# Patient Record
Sex: FEMALE | Race: WHITE | NOT HISPANIC OR LATINO | Employment: OTHER | URBAN - METROPOLITAN AREA
[De-identification: names, ages, dates, MRNs, and addresses within clinical notes are randomized per-mention and may not be internally consistent; named-entity substitution may affect disease eponyms.]

---

## 2024-02-08 ENCOUNTER — HOSPITAL ENCOUNTER (EMERGENCY)
Facility: HOSPITAL | Age: 24
Discharge: HOME | End: 2024-02-09
Attending: EMERGENCY MEDICINE
Payer: COMMERCIAL

## 2024-02-08 VITALS
HEART RATE: 89 BPM | BODY MASS INDEX: 30.36 KG/M2 | WEIGHT: 165 LBS | DIASTOLIC BLOOD PRESSURE: 74 MMHG | OXYGEN SATURATION: 97 % | HEIGHT: 62 IN | RESPIRATION RATE: 18 BRPM | SYSTOLIC BLOOD PRESSURE: 111 MMHG | TEMPERATURE: 98.3 F

## 2024-02-08 DIAGNOSIS — J11.1 FLU: Primary | ICD-10-CM

## 2024-02-08 PROCEDURE — 99283 EMERGENCY DEPT VISIT LOW MDM: CPT | Performed by: EMERGENCY MEDICINE

## 2024-02-08 ASSESSMENT — PAIN SCALES - GENERAL: PAINLEVEL_OUTOF10: 7

## 2024-02-08 ASSESSMENT — COLUMBIA-SUICIDE SEVERITY RATING SCALE - C-SSRS
1. IN THE PAST MONTH, HAVE YOU WISHED YOU WERE DEAD OR WISHED YOU COULD GO TO SLEEP AND NOT WAKE UP?: NO
6. HAVE YOU EVER DONE ANYTHING, STARTED TO DO ANYTHING, OR PREPARED TO DO ANYTHING TO END YOUR LIFE?: NO
2. HAVE YOU ACTUALLY HAD ANY THOUGHTS OF KILLING YOURSELF?: NO

## 2024-02-08 ASSESSMENT — PAIN - FUNCTIONAL ASSESSMENT: PAIN_FUNCTIONAL_ASSESSMENT: 0-10

## 2024-02-09 ENCOUNTER — APPOINTMENT (OUTPATIENT)
Dept: RADIOLOGY | Facility: HOSPITAL | Age: 24
End: 2024-02-09
Payer: COMMERCIAL

## 2024-02-09 LAB
FLUAV RNA RESP QL NAA+PROBE: NOT DETECTED
FLUBV RNA RESP QL NAA+PROBE: DETECTED
HCG UR QL IA.RAPID: NEGATIVE
RSV RNA RESP QL NAA+PROBE: NOT DETECTED
SARS-COV-2 RNA RESP QL NAA+PROBE: NOT DETECTED

## 2024-02-09 PROCEDURE — 87637 SARSCOV2&INF A&B&RSV AMP PRB: CPT | Performed by: NURSE PRACTITIONER

## 2024-02-09 PROCEDURE — 71046 X-RAY EXAM CHEST 2 VIEWS: CPT

## 2024-02-09 PROCEDURE — 71046 X-RAY EXAM CHEST 2 VIEWS: CPT | Performed by: STUDENT IN AN ORGANIZED HEALTH CARE EDUCATION/TRAINING PROGRAM

## 2024-02-09 PROCEDURE — 81025 URINE PREGNANCY TEST: CPT | Performed by: NURSE PRACTITIONER

## 2024-02-09 RX ORDER — ONDANSETRON 4 MG/1
4 TABLET, ORALLY DISINTEGRATING ORAL EVERY 8 HOURS PRN
Qty: 20 TABLET | Refills: 0 | Status: SHIPPED | OUTPATIENT
Start: 2024-02-09

## 2024-02-09 NOTE — ED PROVIDER NOTES
Chief Complaint   Patient presents with    Cough    Chest Pain       HPI       23 year old female presents to the Emergency Department today complaining of a 2-3 day history of body aches, nasal congestion, postnasal drip, nonproductive cough, and diarrhea. Denies any associated fever, chills, headache, neck pain, chest pain, shortness of breath, abdominal pain, nausea, vomiting, constipation, or urinary symptoms. Reports that her children have been ill with similar symptoms.       History provided by:  Patient             Patient History   Past Medical History:   Diagnosis Date    12 weeks gestation of pregnancy 02/26/2020    12 weeks gestation of pregnancy    Encounter for supervision of other normal pregnancy, first trimester 12/09/2021    Multigravida in first trimester    Encounter for supervision of other normal pregnancy, second trimester 05/20/2020    Multigravida in second trimester     Past Surgical History:   Procedure Laterality Date    OTHER SURGICAL HISTORY  07/27/2021    No history of surgery     No family history on file.  Social History     Tobacco Use    Smoking status: Never    Smokeless tobacco: Never   Vaping Use    Vaping Use: Every day    Substances: Nicotine   Substance Use Topics    Alcohol use: Yes     Comment: social    Drug use: Yes     Types: Marijuana           Physical Exam  Constitutional:       Appearance: Normal appearance.   HENT:      Head: Normocephalic.      Right Ear: Tympanic membrane, ear canal and external ear normal.      Left Ear: Tympanic membrane, ear canal and external ear normal.      Nose: Nose normal.      Mouth/Throat:      Lips: Pink.      Mouth: Mucous membranes are moist.      Dentition: No dental caries.      Pharynx: Oropharynx is clear. Uvula midline. No oropharyngeal exudate or posterior oropharyngeal erythema.      Tonsils: No tonsillar exudate. 1+ on the right. 1+ on the left.   Eyes:      Conjunctiva/sclera: Conjunctivae normal.      Pupils: Pupils are  equal, round, and reactive to light.   Cardiovascular:      Rate and Rhythm: Normal rate and regular rhythm.      Heart sounds: No murmur heard.     No friction rub. No gallop.   Pulmonary:      Effort: Pulmonary effort is normal. No respiratory distress.      Breath sounds: Normal breath sounds. No stridor. No wheezing, rhonchi or rales.   Neurological:      Mental Status: She is alert.         Labs Reviewed - No data to display    No orders to display            ED Course & MDM   Diagnoses as of 02/11/24 1000   Flu           Medical Decision Making  Patient was seen and evaluated by Dr. Brown.              Your medication list      You have not been prescribed any medications.           Procedure  Procedures     AMINATA Rooney-OLGA  02/09/24 0029       AMINATA Rooney-OLGA  02/09/24 0030      This patient was seen by the ADAM.  I have personally seen and examined the patient. I made / approved the management plan and take responsibility for patient management. I reviewed and edited the above documentation where necessary.     Patient with cough, congestion, fever, malaise in setting of exposure to family members with similar symptoms.  hCG, COVID, RSV are negative.  Patient did test positive for flu.  Chest x-ray was performed and does not show any evidence of consolidation by my read.  Confirmed to be without acute findings by radiologist.    Patient is outside the window for any benefit from Tamiflu.  Results were relayed to the patient by nursing staff.  The patient is requesting discharge however was given the opportunity to stay to be further evaluated by me.    Yovana Brown DO  Emergency Medicine       Yovana Brown DO  02/11/24 1006

## 2024-02-09 NOTE — ED TRIAGE NOTES
Pt here after being diagnosed with tonsillitis at urgent care. She is now having right sided rib pain and SOB. No distress noted in triage.

## 2024-05-13 ENCOUNTER — HOSPITAL ENCOUNTER (EMERGENCY)
Facility: HOSPITAL | Age: 24
Discharge: HOME | End: 2024-05-13
Attending: STUDENT IN AN ORGANIZED HEALTH CARE EDUCATION/TRAINING PROGRAM
Payer: COMMERCIAL

## 2024-05-13 ENCOUNTER — APPOINTMENT (OUTPATIENT)
Dept: RADIOLOGY | Facility: HOSPITAL | Age: 24
End: 2024-05-13
Payer: COMMERCIAL

## 2024-05-13 VITALS
HEIGHT: 62 IN | BODY MASS INDEX: 29.44 KG/M2 | TEMPERATURE: 98.6 F | OXYGEN SATURATION: 99 % | RESPIRATION RATE: 16 BRPM | SYSTOLIC BLOOD PRESSURE: 114 MMHG | WEIGHT: 160 LBS | DIASTOLIC BLOOD PRESSURE: 74 MMHG | HEART RATE: 62 BPM

## 2024-05-13 DIAGNOSIS — B37.9 ANTIBIOTIC-INDUCED YEAST INFECTION: ICD-10-CM

## 2024-05-13 DIAGNOSIS — K61.1 PERIRECTAL ABSCESS: Primary | ICD-10-CM

## 2024-05-13 DIAGNOSIS — T36.95XA ANTIBIOTIC-INDUCED YEAST INFECTION: ICD-10-CM

## 2024-05-13 LAB
ALBUMIN SERPL BCP-MCNC: 4.4 G/DL (ref 3.4–5)
ALP SERPL-CCNC: 66 U/L (ref 33–110)
ALT SERPL W P-5'-P-CCNC: 20 U/L (ref 7–45)
ANION GAP SERPL CALC-SCNC: 9 MMOL/L (ref 10–20)
APPEARANCE UR: ABNORMAL
AST SERPL W P-5'-P-CCNC: 22 U/L (ref 9–39)
BASOPHILS # BLD AUTO: 0.03 X10*3/UL (ref 0–0.1)
BASOPHILS NFR BLD AUTO: 0.3 %
BILIRUB SERPL-MCNC: 0.7 MG/DL (ref 0–1.2)
BILIRUB UR STRIP.AUTO-MCNC: NEGATIVE MG/DL
BUN SERPL-MCNC: 12 MG/DL (ref 6–23)
CALCIUM SERPL-MCNC: 9.2 MG/DL (ref 8.6–10.3)
CAOX CRY #/AREA UR COMP ASSIST: ABNORMAL /HPF
CHLORIDE SERPL-SCNC: 105 MMOL/L (ref 98–107)
CO2 SERPL-SCNC: 27 MMOL/L (ref 21–32)
COLOR UR: YELLOW
CREAT SERPL-MCNC: 0.8 MG/DL (ref 0.5–1.05)
EGFRCR SERPLBLD CKD-EPI 2021: >90 ML/MIN/1.73M*2
EOSINOPHIL # BLD AUTO: 0.09 X10*3/UL (ref 0–0.7)
EOSINOPHIL NFR BLD AUTO: 1 %
ERYTHROCYTE [DISTWIDTH] IN BLOOD BY AUTOMATED COUNT: 13.6 % (ref 11.5–14.5)
GLUCOSE SERPL-MCNC: 94 MG/DL (ref 74–99)
GLUCOSE UR STRIP.AUTO-MCNC: NEGATIVE MG/DL
HCG UR QL IA.RAPID: NEGATIVE
HCT VFR BLD AUTO: 40.8 % (ref 36–46)
HGB BLD-MCNC: 13.4 G/DL (ref 12–16)
IMM GRANULOCYTES # BLD AUTO: 0.02 X10*3/UL (ref 0–0.7)
IMM GRANULOCYTES NFR BLD AUTO: 0.2 % (ref 0–0.9)
KETONES UR STRIP.AUTO-MCNC: ABNORMAL MG/DL
LACTATE SERPL-SCNC: 1 MMOL/L (ref 0.4–2)
LEUKOCYTE ESTERASE UR QL STRIP.AUTO: ABNORMAL
LYMPHOCYTES # BLD AUTO: 1.22 X10*3/UL (ref 1.2–4.8)
LYMPHOCYTES NFR BLD AUTO: 13.5 %
MCH RBC QN AUTO: 29.1 PG (ref 26–34)
MCHC RBC AUTO-ENTMCNC: 32.8 G/DL (ref 32–36)
MCV RBC AUTO: 89 FL (ref 80–100)
MONOCYTES # BLD AUTO: 0.66 X10*3/UL (ref 0.1–1)
MONOCYTES NFR BLD AUTO: 7.3 %
MUCOUS THREADS #/AREA URNS AUTO: ABNORMAL /LPF
NEUTROPHILS # BLD AUTO: 7.01 X10*3/UL (ref 1.2–7.7)
NEUTROPHILS NFR BLD AUTO: 77.7 %
NITRITE UR QL STRIP.AUTO: NEGATIVE
NRBC BLD-RTO: 0 /100 WBCS (ref 0–0)
PH UR STRIP.AUTO: 5 [PH]
PLATELET # BLD AUTO: 314 X10*3/UL (ref 150–450)
POTASSIUM SERPL-SCNC: 3.2 MMOL/L (ref 3.5–5.3)
PROT SERPL-MCNC: 7.1 G/DL (ref 6.4–8.2)
PROT UR STRIP.AUTO-MCNC: ABNORMAL MG/DL
RBC # BLD AUTO: 4.6 X10*6/UL (ref 4–5.2)
RBC # UR STRIP.AUTO: NEGATIVE /UL
RBC #/AREA URNS AUTO: ABNORMAL /HPF
SODIUM SERPL-SCNC: 138 MMOL/L (ref 136–145)
SP GR UR STRIP.AUTO: 1.04
SQUAMOUS #/AREA URNS AUTO: ABNORMAL /HPF
UROBILINOGEN UR STRIP.AUTO-MCNC: <2 MG/DL
WBC # BLD AUTO: 9 X10*3/UL (ref 4.4–11.3)
WBC #/AREA URNS AUTO: ABNORMAL /HPF
WBC CLUMPS #/AREA URNS AUTO: ABNORMAL /HPF

## 2024-05-13 PROCEDURE — 85025 COMPLETE CBC W/AUTO DIFF WBC: CPT | Performed by: NURSE PRACTITIONER

## 2024-05-13 PROCEDURE — 83605 ASSAY OF LACTIC ACID: CPT | Performed by: NURSE PRACTITIONER

## 2024-05-13 PROCEDURE — 36415 COLL VENOUS BLD VENIPUNCTURE: CPT | Performed by: NURSE PRACTITIONER

## 2024-05-13 PROCEDURE — 96361 HYDRATE IV INFUSION ADD-ON: CPT | Performed by: STUDENT IN AN ORGANIZED HEALTH CARE EDUCATION/TRAINING PROGRAM

## 2024-05-13 PROCEDURE — 2500000006 HC RX 250 W HCPCS SELF ADMINISTERED DRUGS (ALT 637 FOR ALL PAYERS): Performed by: NURSE PRACTITIONER

## 2024-05-13 PROCEDURE — 81001 URINALYSIS AUTO W/SCOPE: CPT | Performed by: NURSE PRACTITIONER

## 2024-05-13 PROCEDURE — 2550000001 HC RX 255 CONTRASTS: Performed by: NURSE PRACTITIONER

## 2024-05-13 PROCEDURE — 2500000004 HC RX 250 GENERAL PHARMACY W/ HCPCS (ALT 636 FOR OP/ED): Performed by: NURSE PRACTITIONER

## 2024-05-13 PROCEDURE — 74177 CT ABD & PELVIS W/CONTRAST: CPT

## 2024-05-13 PROCEDURE — 2500000004 HC RX 250 GENERAL PHARMACY W/ HCPCS (ALT 636 FOR OP/ED): Performed by: STUDENT IN AN ORGANIZED HEALTH CARE EDUCATION/TRAINING PROGRAM

## 2024-05-13 PROCEDURE — 80053 COMPREHEN METABOLIC PANEL: CPT | Performed by: NURSE PRACTITIONER

## 2024-05-13 PROCEDURE — 96365 THER/PROPH/DIAG IV INF INIT: CPT | Performed by: STUDENT IN AN ORGANIZED HEALTH CARE EDUCATION/TRAINING PROGRAM

## 2024-05-13 PROCEDURE — 74177 CT ABD & PELVIS W/CONTRAST: CPT | Performed by: RADIOLOGY

## 2024-05-13 PROCEDURE — 99284 EMERGENCY DEPT VISIT MOD MDM: CPT | Mod: 25 | Performed by: STUDENT IN AN ORGANIZED HEALTH CARE EDUCATION/TRAINING PROGRAM

## 2024-05-13 PROCEDURE — 87086 URINE CULTURE/COLONY COUNT: CPT | Mod: PORLAB | Performed by: NURSE PRACTITIONER

## 2024-05-13 PROCEDURE — 81025 URINE PREGNANCY TEST: CPT | Performed by: NURSE PRACTITIONER

## 2024-05-13 PROCEDURE — A4217 STERILE WATER/SALINE, 500 ML: HCPCS | Performed by: NURSE PRACTITIONER

## 2024-05-13 PROCEDURE — 2500000001 HC RX 250 WO HCPCS SELF ADMINISTERED DRUGS (ALT 637 FOR MEDICARE OP)

## 2024-05-13 PROCEDURE — 96375 TX/PRO/DX INJ NEW DRUG ADDON: CPT | Performed by: STUDENT IN AN ORGANIZED HEALTH CARE EDUCATION/TRAINING PROGRAM

## 2024-05-13 PROCEDURE — 87040 BLOOD CULTURE FOR BACTERIA: CPT | Mod: PORLAB | Performed by: NURSE PRACTITIONER

## 2024-05-13 RX ORDER — AMOXICILLIN AND CLAVULANATE POTASSIUM 875; 125 MG/1; MG/1
1 TABLET, FILM COATED ORAL 2 TIMES DAILY
Qty: 14 TABLET | Refills: 0 | Status: SHIPPED | OUTPATIENT
Start: 2024-05-13 | End: 2024-05-21

## 2024-05-13 RX ORDER — KETOROLAC TROMETHAMINE 30 MG/ML
INJECTION, SOLUTION INTRAMUSCULAR; INTRAVENOUS
Status: COMPLETED
Start: 2024-05-13 | End: 2024-05-13

## 2024-05-13 RX ORDER — POTASSIUM CHLORIDE 750 MG/1
40 TABLET, FILM COATED, EXTENDED RELEASE ORAL ONCE
Status: COMPLETED | OUTPATIENT
Start: 2024-05-13 | End: 2024-05-13

## 2024-05-13 RX ORDER — FLUCONAZOLE 150 MG/1
150 TABLET ORAL AS NEEDED
Qty: 2 TABLET | Refills: 0 | Status: SHIPPED | OUTPATIENT
Start: 2024-05-13

## 2024-05-13 RX ORDER — DIPHENHYDRAMINE HCL 25 MG
50 CAPSULE ORAL ONCE
Status: COMPLETED | OUTPATIENT
Start: 2024-05-13 | End: 2024-05-13

## 2024-05-13 RX ORDER — KETOROLAC TROMETHAMINE 30 MG/ML
15 INJECTION, SOLUTION INTRAMUSCULAR; INTRAVENOUS ONCE
Status: COMPLETED | OUTPATIENT
Start: 2024-05-13 | End: 2024-05-13

## 2024-05-13 RX ORDER — ONDANSETRON HYDROCHLORIDE 2 MG/ML
4 INJECTION, SOLUTION INTRAVENOUS ONCE
Status: COMPLETED | OUTPATIENT
Start: 2024-05-13 | End: 2024-05-13

## 2024-05-13 RX ORDER — DIPHENHYDRAMINE HCL 25 MG
CAPSULE ORAL
Status: COMPLETED
Start: 2024-05-13 | End: 2024-05-13

## 2024-05-13 RX ORDER — NAPROXEN 500 MG/1
500 TABLET ORAL
Qty: 14 TABLET | Refills: 0 | Status: SHIPPED | OUTPATIENT
Start: 2024-05-13 | End: 2024-05-21

## 2024-05-13 RX ADMIN — IOHEXOL 75 ML: 350 INJECTION, SOLUTION INTRAVENOUS at 19:10

## 2024-05-13 RX ADMIN — KETOROLAC TROMETHAMINE 15 MG: 30 INJECTION, SOLUTION INTRAMUSCULAR at 15:54

## 2024-05-13 RX ADMIN — PIPERACILLIN SODIUM AND TAZOBACTAM SODIUM 4.5 G: 4; .5 INJECTION, SOLUTION INTRAVENOUS at 15:53

## 2024-05-13 RX ADMIN — VANCOMYCIN HYDROCHLORIDE 2 G: 10 INJECTION, POWDER, LYOPHILIZED, FOR SOLUTION INTRAVENOUS at 16:31

## 2024-05-13 RX ADMIN — DIPHENHYDRAMINE HYDROCHLORIDE 50 MG: 25 CAPSULE ORAL at 18:26

## 2024-05-13 RX ADMIN — SODIUM CHLORIDE 1000 ML: 9 INJECTION, SOLUTION INTRAVENOUS at 15:54

## 2024-05-13 RX ADMIN — Medication 50 MG: at 18:26

## 2024-05-13 RX ADMIN — ONDANSETRON 4 MG: 2 INJECTION INTRAMUSCULAR; INTRAVENOUS at 15:54

## 2024-05-13 RX ADMIN — POTASSIUM CHLORIDE 40 MEQ: 750 TABLET, FILM COATED, EXTENDED RELEASE ORAL at 17:37

## 2024-05-13 RX ADMIN — KETOROLAC TROMETHAMINE 15 MG: 30 INJECTION, SOLUTION INTRAMUSCULAR; INTRAVENOUS at 15:54

## 2024-05-13 ASSESSMENT — PAIN - FUNCTIONAL ASSESSMENT
PAIN_FUNCTIONAL_ASSESSMENT: 0-10
PAIN_FUNCTIONAL_ASSESSMENT: 0-10

## 2024-05-13 ASSESSMENT — LIFESTYLE VARIABLES
EVER HAD A DRINK FIRST THING IN THE MORNING TO STEADY YOUR NERVES TO GET RID OF A HANGOVER: NO
EVER FELT BAD OR GUILTY ABOUT YOUR DRINKING: NO
HAVE YOU EVER FELT YOU SHOULD CUT DOWN ON YOUR DRINKING: NO
TOTAL SCORE: 0
HAVE PEOPLE ANNOYED YOU BY CRITICIZING YOUR DRINKING: NO

## 2024-05-13 ASSESSMENT — COLUMBIA-SUICIDE SEVERITY RATING SCALE - C-SSRS
6. HAVE YOU EVER DONE ANYTHING, STARTED TO DO ANYTHING, OR PREPARED TO DO ANYTHING TO END YOUR LIFE?: NO
2. HAVE YOU ACTUALLY HAD ANY THOUGHTS OF KILLING YOURSELF?: NO
1. IN THE PAST MONTH, HAVE YOU WISHED YOU WERE DEAD OR WISHED YOU COULD GO TO SLEEP AND NOT WAKE UP?: NO

## 2024-05-13 ASSESSMENT — PAIN SCALES - GENERAL
PAINLEVEL_OUTOF10: 7
PAINLEVEL_OUTOF10: 7

## 2024-05-13 NOTE — ED PROVIDER NOTES
"HPI   Chief Complaint   Patient presents with    Abscess       Patient presents the emergency department for evaluation of concern of a perirectal abscess.  This has been ongoing for 3 days.  Initially she thought it was a hemorrhoid however she quickly figured out it was not.  She has a history of skin abscess requiring formal incision and drainage in the past with no history of hidradenitis suppurativa.  She denies being a diabetic and is not on any anticoagulants.  She has tried some warm compresses over the last few days and last had a normal bowel movement yesterday.  She feels she has to have a bowel movement today however she feels she would not be able to tolerate the pain.  The mass has grown in size over the last 3 days with no spontaneous drainage and today was associated with lightheadedness, chills, nausea and abdominal \"tightness.\"  There is no associated syncope, URI symptoms, chest pain, shortness of breath, vomiting, black or bloody stools, UTI symptoms, concern for pregnancy, change in vaginal secretions, known exposure to STI, leg swelling or rash.  She has not taken any over-the-counter medications for symptoms.  Her symptoms are moderate to severe in severity and persistent nature.      History provided by:  Patient   used: No                        Neetu Coma Scale Score: 15                     Patient History   Past Medical History:   Diagnosis Date    12 weeks gestation of pregnancy ([facility]) 02/26/2020    12 weeks gestation of pregnancy    Encounter for supervision of other normal pregnancy, first trimester ([facility]) 12/09/2021    Multigravida in first trimester    Encounter for supervision of other normal pregnancy, second trimester ([facility]) 05/20/2020    Multigravida in second trimester     Past Surgical History:   Procedure Laterality Date    OTHER SURGICAL HISTORY  07/27/2021    No history of surgery     No family history on file.  Social History     Tobacco Use    " Smoking status: Never    Smokeless tobacco: Never   Vaping Use    Vaping status: Every Day    Substances: Nicotine   Substance Use Topics    Alcohol use: Yes     Comment: social    Drug use: Yes     Types: Marijuana       Physical Exam   ED Triage Vitals [05/13/24 1451]   Temperature Heart Rate Respirations BP   36.9 °C (98.4 °F) (!) 105 20 113/65      Pulse Ox Temp src Heart Rate Source Patient Position   99 % -- -- --      BP Location FiO2 (%)     -- --       Physical Exam  Vitals reviewed. Exam conducted with a chaperone present.   Constitutional:       General: She is not in acute distress.     Appearance: Normal appearance.   HENT:      Head: Normocephalic and atraumatic.      Mouth/Throat:      Lips: Pink.      Mouth: Mucous membranes are moist.      Pharynx: Oropharynx is clear.   Cardiovascular:      Rate and Rhythm: Regular rhythm. Tachycardia present.      Pulses:           Radial pulses are 2+ on the right side.      Heart sounds: No murmur heard.  Pulmonary:      Effort: Pulmonary effort is normal.      Breath sounds: Normal breath sounds.   Abdominal:      General: Bowel sounds are normal.      Palpations: Abdomen is soft.      Tenderness: There is no abdominal tenderness.   Genitourinary:      Musculoskeletal:      Cervical back: Full passive range of motion without pain and neck supple.      Comments: MARSHALL randomly.   Skin:     General: Skin is warm and dry.      Capillary Refill: Capillary refill takes less than 2 seconds.      Coloration: Skin is not pale.      Findings: No rash.   Neurological:      General: No focal deficit present.      Mental Status: She is alert and oriented to person, place, and time.      Sensory: Sensation is intact.      Motor: Motor function is intact.      Coordination: Coordination is intact.   Psychiatric:         Behavior: Behavior is cooperative.         ED Course & MDM   ED Course as of 05/13/24 2052   Mon May 13, 2024   1726 Patient updated on lab results and aware  she hasn't gone to CT as she hasn't had a pregnancy test. Reinstructed and reports abscess has spontaneously bled.  [NA]      ED Course User Index  [NA] Nini ROSA Villela, APRN-CNP         Diagnoses as of 05/13/24 2052   Perirectal abscess   Antibiotic-induced yeast infection       Medical Decision Making  Patient presents the emergency department for evaluation of a lump to her rectum.  Differential diagnosis of perirectal abscess, hemorrhoid and rectal mass to mention a few.  All clinical exam she has a perirectal abscess.  She is tachycardic and had chills today with a clinical perirectal abscess therefore sepsis orders were placed and she was given empiric Zosyn and vancomycin while awaiting results.  Plan is for IV fluid, baseline labs to include lactate and blood cultures and CT of the abdomen pelvis with IV contrast.  Will consult general surgery appropriately as well as ER attending.     Patient evaluated by Dr. Cantu.  Labs and diagnostics as resulted above with no leukocytosis or elevated lactate.  Potassium at 3.2 was supplemented with K-Dur.  There is no renal insufficiency or transaminitis.  No anemia.  Patient ultimately ended up having spontaneous drainage of her abscess while waiting to go to radiology.  Imaging as interpreted by radiologist with CT of the abdomen pelvis with IV contrast showing a small 8 x 8 x 15 mm fluid collection involving the right lower gluteal fold suspicious for abscess.  Consult with Dr. Chilel to see if patient required any further intervention since she is already spontaneously draining in which antibiotics they would prefer.  She is appropriate for discharge home and no mention of further intervention at this time.  We elected Augmentin twice daily for 7 days as she does not have any urinary symptoms with her urinalysis as above while pending urine culture.    Plan is for Augmentin twice daily for 7 days, naproxen twice daily with meals for 7 days and patient does  tend to get antibiotic associated yeast infections therefore she was given a prescription for Diflucan, 1 pill for the onset of symptoms and second dose to be taken when antibiotic regimen is complete.  Patient encouraged to call Dr Chilel's office tomorrow for follow up and perform warm compresses 3-4 times a day while awaiting follow up. Patient is non-toxic, not hypoxic and appropriate for this outpatient management plan which they prefer. Encouragement to arrange close follow up was discussed as well as provided in a written handout of discharge instructions. Patient was educated on signs of symptoms to watch for indicative of re-evaluation in the emergency department setting to include any worsening of current symptoms. They verbalized understanding of instructions and is amenable to this treatment plan with no social determinants of health that would obscure this plan. Patient departed in stable condition.       CONSULT:    Procedure  Procedures     WYATT Angeles  05/13/24 2052       WYATT Angeles  05/13/24 2053

## 2024-05-13 NOTE — Clinical Note
Irma Archibald was seen and treated in our emergency department on 5/13/2024.  She may return to work on 05/16/2024.       If you have any questions or concerns, please don't hesitate to call.      Nini Villela, APRN-CNP

## 2024-05-14 LAB — HOLD SPECIMEN: NORMAL

## 2024-05-15 LAB — BACTERIA UR CULT: NO GROWTH

## 2024-05-18 LAB
BACTERIA BLD CULT: NORMAL
BACTERIA BLD CULT: NORMAL

## 2024-05-20 PROBLEM — Z01.419 WELL WOMAN EXAM: Status: ACTIVE | Noted: 2024-05-20

## 2024-05-21 ENCOUNTER — OFFICE VISIT (OUTPATIENT)
Dept: OBSTETRICS AND GYNECOLOGY | Facility: CLINIC | Age: 24
End: 2024-05-21
Payer: COMMERCIAL

## 2024-05-21 VITALS — WEIGHT: 162 LBS | BODY MASS INDEX: 29.81 KG/M2 | HEIGHT: 62 IN

## 2024-05-21 DIAGNOSIS — Z30.011 ENCOUNTER FOR INITIAL PRESCRIPTION OF CONTRACEPTIVE PILLS: ICD-10-CM

## 2024-05-21 DIAGNOSIS — Z11.3 SCREEN FOR STD (SEXUALLY TRANSMITTED DISEASE): ICD-10-CM

## 2024-05-21 DIAGNOSIS — Z01.419 WELL WOMAN EXAM: Primary | ICD-10-CM

## 2024-05-21 PROCEDURE — 1036F TOBACCO NON-USER: CPT | Performed by: OBSTETRICS & GYNECOLOGY

## 2024-05-21 PROCEDURE — 87491 CHLMYD TRACH DNA AMP PROBE: CPT

## 2024-05-21 PROCEDURE — 88175 CYTOPATH C/V AUTO FLUID REDO: CPT

## 2024-05-21 PROCEDURE — 88141 CYTOPATH C/V INTERPRET: CPT | Performed by: PATHOLOGY

## 2024-05-21 PROCEDURE — 99395 PREV VISIT EST AGE 18-39: CPT | Performed by: OBSTETRICS & GYNECOLOGY

## 2024-05-21 PROCEDURE — 87591 N.GONORRHOEAE DNA AMP PROB: CPT

## 2024-05-21 PROCEDURE — 87624 HPV HI-RISK TYP POOLED RSLT: CPT

## 2024-05-21 RX ORDER — DROSPIRENONE AND ETHINYL ESTRADIOL 0.02-3(28)
KIT ORAL
Qty: 28 TABLET | Refills: 12 | Status: SHIPPED | OUTPATIENT
Start: 2024-05-21 | End: 2024-06-10 | Stop reason: WASHOUT

## 2024-05-21 RX ORDER — DOXYCYCLINE 100 MG/1
100 CAPSULE ORAL DAILY
COMMUNITY
Start: 2024-01-06

## 2024-05-21 NOTE — ASSESSMENT & PLAN NOTE
--ANNUAL EXAM: Patient due for her routine exam.  Desires testing for STDs.  Menses are regular and monthly.  Normal exam.  Desires to start birth control, will start on Lisa, and give information on the Mirena IUD.  Routine Pap performed.  --CONTRACEPTION: Patient desires to start contraception, has used Depo-Provera and NuvaRing previously and did not tolerate well.  Patient has tolerated the pill well.  Patient also concern for acne.  Will start with Lisa.  Also discussed options of Nexplanon and Mirena IUD, patient interested in information on the Mirena IUD.  --Desires testing for STDs: Will perform GC, chlamydia, HIV, hepatitis, and RPR  --Normal Pap in 2020, will repeat today  -- Patient is P4    PLAN:  Pap  GC chlamydia  HIV, RPR, and hep B/C  Start Lisa  Information on Mirena IUD  Otherwise follow-up in 1 year

## 2024-05-21 NOTE — PROGRESS NOTES
Subjective   Patient ID: Irma Archibald is a 24 y.o. female who presents for Annual Exam (Here for annual exam and wanted to do std screening ).  HPI  24-year-old here for routine annual exam.  Patient with irregular monthly menses.  Patient does desire testing for STDs.  Patient otherwise doing well with no other concerns.        Objective   Physical Exam  Exam conducted with a chaperone present.   Constitutional:       Appearance: Normal appearance.   Cardiovascular:      Rate and Rhythm: Normal rate and regular rhythm.   Pulmonary:      Effort: Pulmonary effort is normal.      Breath sounds: Normal breath sounds.   Chest:   Breasts:     Right: Normal. No mass or tenderness.      Left: Normal. No mass or tenderness.   Abdominal:      Palpations: Abdomen is soft. There is no mass.      Tenderness: There is no abdominal tenderness.   Genitourinary:     General: Normal vulva.      Vagina: Normal. No lesions.      Cervix: No lesion.      Uterus: Normal. Not enlarged and not tender.       Adnexa: Right adnexa normal and left adnexa normal.        Right: No mass or tenderness.          Left: No mass or tenderness.     Musculoskeletal:      Cervical back: Neck supple.   Skin:     General: Skin is warm and dry.   Neurological:      Mental Status: She is alert and oriented to person, place, and time.   Psychiatric:         Mood and Affect: Mood normal.         Behavior: Behavior normal.         Assessment/Plan   Problem List Items Addressed This Visit             ICD-10-CM    Well woman exam - Primary Z01.419     --ANNUAL EXAM: Patient due for her routine exam.  Desires testing for STDs.  Menses are regular and monthly.  Normal exam.  Desires to start birth control, will start on Lisa, and give information on the Mirena IUD.  Routine Pap performed.  --CONTRACEPTION: Patient desires to start contraception, has used Depo-Provera and NuvaRing previously and did not tolerate well.  Patient has tolerated the pill well.   Patient also concern for acne.  Will start with Lisa.  Also discussed options of Nexplanon and Mirena IUD, patient interested in information on the Mirena IUD.  --Desires testing for STDs: Will perform GC, chlamydia, HIV, hepatitis, and RPR  --Normal Pap in 2020, will repeat today  -- Patient is P4    PLAN:  Pap  GC chlamydia  HIV, RPR, and hep B/C  Start Lisa  Information on Mirena IUD  Otherwise follow-up in 1 year         Relevant Orders    THINPREP PAP     Other Visit Diagnoses         Codes    Screen for STD (sexually transmitted disease)     Z11.3    Relevant Orders    Hepatitis C Antibody    Hepatitis B Surface Antigen    HIV 1/2 Antigen/Antibody Screen with Reflex to Confirmation    Syphilis Screen with Reflex    THINPREP PAP    Encounter for initial prescription of contraceptive pills     Z30.011    Relevant Medications    drospirenone-ethinyl estradioL (Lisa, 28,) 3-0.02 mg tablet

## 2024-05-22 LAB
C TRACH RRNA SPEC QL NAA+PROBE: NEGATIVE
N GONORRHOEA DNA SPEC QL PROBE+SIG AMP: NEGATIVE

## 2024-06-04 LAB
CYTOLOGY CMNT CVX/VAG CYTO-IMP: NORMAL
HPV HR GENOTYPES PNL CVX NAA+PROBE: POSITIVE
LAB AP HPV GENOTYPE QUESTION: NO
LAB AP HPV HR: NORMAL
LAB AP PAP ADDITIONAL TESTS: NORMAL
LABORATORY COMMENT REPORT: NORMAL
LMP START DATE: NORMAL
PATH REPORT.TOTAL CANCER: NORMAL

## 2024-06-05 ENCOUNTER — TELEPHONE (OUTPATIENT)
Dept: OBSTETRICS AND GYNECOLOGY | Facility: CLINIC | Age: 24
End: 2024-06-05
Payer: COMMERCIAL

## 2024-06-05 PROBLEM — R87.610 ASCUS WITH POSITIVE HIGH RISK HPV CERVICAL: Status: ACTIVE | Noted: 2024-06-05

## 2024-06-05 PROBLEM — R87.810 ASCUS WITH POSITIVE HIGH RISK HPV CERVICAL: Status: ACTIVE | Noted: 2024-06-05

## 2024-06-05 NOTE — TELEPHONE ENCOUNTER
----- Message from Jarad Callahan MD sent at 6/5/2024  9:44 AM EDT -----  Regarding: Please call the patient  Please notify the patient her Pap revealed ASCUS with positive HPV, would recommend colposcopy.  ----- Message -----  From: Lab, Background User  Sent: 5/22/2024   4:53 PM EDT  To: Jarad Callahan MD

## 2024-06-10 ENCOUNTER — PROCEDURE VISIT (OUTPATIENT)
Dept: OBSTETRICS AND GYNECOLOGY | Facility: CLINIC | Age: 24
End: 2024-06-10
Payer: COMMERCIAL

## 2024-06-10 VITALS
HEIGHT: 62 IN | DIASTOLIC BLOOD PRESSURE: 70 MMHG | BODY MASS INDEX: 29.44 KG/M2 | SYSTOLIC BLOOD PRESSURE: 112 MMHG | WEIGHT: 160 LBS

## 2024-06-10 DIAGNOSIS — R87.810 ASCUS WITH POSITIVE HIGH RISK HPV CERVICAL: ICD-10-CM

## 2024-06-10 DIAGNOSIS — Z30.430 ENCOUNTER FOR INSERTION OF MIRENA IUD: ICD-10-CM

## 2024-06-10 DIAGNOSIS — R87.610 ASCUS WITH POSITIVE HIGH RISK HPV CERVICAL: ICD-10-CM

## 2024-06-10 PROCEDURE — 57454 BX/CURETT OF CERVIX W/SCOPE: CPT | Performed by: OBSTETRICS & GYNECOLOGY

## 2024-06-10 PROCEDURE — 58300 INSERT INTRAUTERINE DEVICE: CPT | Performed by: OBSTETRICS & GYNECOLOGY

## 2024-06-10 NOTE — PROGRESS NOTES
Subjective   Patient ID: Irma Archibald is a 24 y.o. female who presents for Contraception (IUD insertion states she started bleeding 2 days ago, 2nd period of the month.).  HPI  24-year-old here for colposcopy for ASCUS with positive HPV, and for insertion of the Mirena IUD.  Patient with LMP starting 2 days ago, had a negative UCG 3 weeks ago, and last coitus 6 weeks ago.  Patient has been on the OCPs.      Colposcopy    Date/Time: 6/10/2024 3:57 PM    Performed by: Jarad Callahan MD  Authorized by: Jarad Callahan MD    Consent:     Consent obtained:  Written    Consent given by:  Patient  Indication:     Cervical indication(s) comment:  ASCUS with positive HPV    Vaginal indication(s) comment:  ASCUS with positive HPV    Vulvar indication(s) comment:  ASCUS with positive HPV    Other indication(s) comment:  ASCUS with positive HPV  Pre-procedure:     Prep solution(s): acetic acid    Procedure:     Colposcopy with: cervical biopsy      Colposcopy details:  TZ visualized, slight acetowhite epithelium at 1:00.  Biopsy taken at 1 o'clock position.  Patient tolerated well.    Cervix visibility: fully visualized      SCJ visibility: fully visualized      Lesion visualized: fully visualized    Post-procedure:     Patient tolerance of procedure:  Patient tolerated the procedure well with no immediate complications  IUD Insertion    Date/Time: 6/10/2024 3:58 PM    Performed by: Jarad Callahan MD  Authorized by: Jarad Callahan MD    Consent:     Consent obtained:  Written    Consent given by:  Patient    Procedure risks and benefits discussed: yes      Patient questions answered: yes      Patient agrees, verbalizes understanding, and wants to proceed: yes      Educational handouts given: yes      Instructions and paperwork completed: yes    Procedure:     Pelvic exam performed: yes      Negative GC/chlamydia test: yes      Cervix cleaned and prepped: yes      Speculum placed in vagina: yes       Tenaculum applied to cervix: yes      Uterus sounded: yes      IUD inserted with no complications: yes      IUD type:  Mirena    Strings trimmed: yes    Post-procedure:     Patient tolerated procedure well: yes      Patient will follow up after next period: yes      Assessment/Plan   Problem List Items Addressed This Visit             ICD-10-CM    Encounter for insertion of mirena IUD Z30.430    Relevant Medications    levonorgestrel (Mirena) 21 mcg/24 hr (8 yrs) 52 mg IUD (Completed)    ASCUS with positive high risk HPV cervical R87.610, R87.810     -- ASCUS with positive HPV: In May 2024, discussed with patient, colposcopy performed, TZ visualized.  Mild acetowhite at 1:00 with biopsy taken at 1:00.  If normal or mild dysplasia we will repeat Pap in 1 year.  --CONTRACEPTION: Patient on yes, desires to proceed with the Mirena.  LMP 2 days ago, has been on Lisa, last coitus 6 weeks ago, had negative UCG May 13.  Unable to give a urine.  Mirena IUD inserted and tolerated well.  Will follow-up in 1 to 2 months for IUD check.  -- Patient is P4    PLAN:  Colposcopy performed  Biopsy at 1:00,   If normal or NEIL-1 we will repeat Pap in 1 year and post results to portal  If NEIL-2 or greater will call patient with results  Mirena IUD inserted  Follow-up in 1 to 2 months for IUD check  Otherwise follow-up in 1 year for annual exam         Relevant Orders    Surgical Pathology Exam

## 2024-06-10 NOTE — ASSESSMENT & PLAN NOTE
-- ASCUS with positive HPV: In May 2024, discussed with patient, colposcopy performed, TZ visualized.  Mild acetowhite at 1:00 with biopsy taken at 1:00.  If normal or mild dysplasia we will repeat Pap in 1 year.  --CONTRACEPTION: Patient on yes, desires to proceed with the Mirena.  LMP 2 days ago, has been on Lisa, last coitus 6 weeks ago, had negative UCG May 13.  Unable to give a urine.  Mirena IUD inserted and tolerated well.  Will follow-up in 1 to 2 months for IUD check.  -- Patient is P4    PLAN:  Colposcopy performed  Biopsy at 1:00,   If normal or NEIL-1 we will repeat Pap in 1 year and post results to portal  If NEIL-2 or greater will call patient with results  Mirena IUD inserted  Follow-up in 1 to 2 months for IUD check  Otherwise follow-up in 1 year for annual exam

## 2024-06-18 LAB
LABORATORY COMMENT REPORT: NORMAL
PATH REPORT.FINAL DX SPEC: NORMAL
PATH REPORT.GROSS SPEC: NORMAL
PATH REPORT.RELEVANT HX SPEC: NORMAL
PATH REPORT.TOTAL CANCER: NORMAL

## 2024-08-09 ENCOUNTER — APPOINTMENT (OUTPATIENT)
Dept: OBSTETRICS AND GYNECOLOGY | Facility: CLINIC | Age: 24
End: 2024-08-09
Payer: COMMERCIAL

## 2024-08-09 VITALS — WEIGHT: 160 LBS | BODY MASS INDEX: 29.26 KG/M2 | SYSTOLIC BLOOD PRESSURE: 120 MMHG | DIASTOLIC BLOOD PRESSURE: 80 MMHG

## 2024-08-09 DIAGNOSIS — Z30.431 IUD CHECK UP: Primary | ICD-10-CM

## 2024-08-09 PROCEDURE — 99212 OFFICE O/P EST SF 10 MIN: CPT | Performed by: NURSE PRACTITIONER

## 2024-08-09 RX ORDER — LEVONORGESTREL 52 MG/1
1 INTRAUTERINE DEVICE INTRAUTERINE ONCE
COMMUNITY

## 2024-08-09 NOTE — PROGRESS NOTES
Subjective   Patient ID: Irma Archibald is a 24 y.o. female who presents for Follow-up (IUD- Mirena inserted 06/10/2024/Reviewing  EMR/Last Annual Exam: 05/21/2024 with Jarad Callahan MD/Ascus Pap / Positive HPV 2024- Colposcopy 06/10/2024 NEIL 1/- patient declined a chaperone- ).  HPI  Here for string check. She is doing well with her IUD. No concerns. Feels bleeding is  improved. Denies any pelvic pain.     Mirena inserted 06/10/2024   Last Annual Exam: 05/21/2024 with Jarad Callahan MD Ascus Pap / Positive HPV 2024- Colposcopy 06/10/2024 NEIL 1     Review of Systems   All other systems reviewed and are negative.      Objective   Physical Exam  Constitutional:       Appearance: Normal appearance.   Pulmonary:      Effort: Pulmonary effort is normal.   Genitourinary:     General: Normal vulva.      Vagina: Normal.      Cervix: Normal.      Uterus: Normal.       Adnexa: Right adnexa normal and left adnexa normal.      Comments: + strings noted on exam.   Neurological:      Mental Status: She is alert.   Psychiatric:         Mood and Affect: Mood normal.         Behavior: Behavior normal.         Assessment/Plan   Diagnoses and all orders for this visit:  IUD check up  Here for string check after having her IUD placed. Doing well. Feels bleeding has improved. Strings visualized on exam.   Follow-up for annual well woman exam when due. Will obtain repeat PAP at that time        WYATT Ashraf 08/09/24 3:33 PM

## 2024-10-28 ENCOUNTER — APPOINTMENT (OUTPATIENT)
Dept: RADIOLOGY | Facility: HOSPITAL | Age: 24
End: 2024-10-28
Payer: COMMERCIAL

## 2024-10-28 ENCOUNTER — HOSPITAL ENCOUNTER (EMERGENCY)
Facility: HOSPITAL | Age: 24
Discharge: HOME | End: 2024-10-28
Payer: COMMERCIAL

## 2024-10-28 VITALS
BODY MASS INDEX: 28.52 KG/M2 | SYSTOLIC BLOOD PRESSURE: 100 MMHG | HEIGHT: 62 IN | RESPIRATION RATE: 16 BRPM | OXYGEN SATURATION: 99 % | TEMPERATURE: 96.6 F | WEIGHT: 155 LBS | HEART RATE: 54 BPM | DIASTOLIC BLOOD PRESSURE: 70 MMHG

## 2024-10-28 DIAGNOSIS — R10.10 PAIN OF UPPER ABDOMEN: Primary | ICD-10-CM

## 2024-10-28 DIAGNOSIS — K83.8 DILATED CBD, ACQUIRED: ICD-10-CM

## 2024-10-28 LAB
ALBUMIN SERPL BCP-MCNC: 4.5 G/DL (ref 3.4–5)
ALP SERPL-CCNC: 58 U/L (ref 33–110)
ALT SERPL W P-5'-P-CCNC: 17 U/L (ref 7–45)
ANION GAP SERPL CALC-SCNC: 11 MMOL/L (ref 10–20)
APPEARANCE UR: CLEAR
AST SERPL W P-5'-P-CCNC: 20 U/L (ref 9–39)
BASOPHILS # BLD AUTO: 0.04 X10*3/UL (ref 0–0.1)
BASOPHILS NFR BLD AUTO: 0.6 %
BILIRUB SERPL-MCNC: 0.5 MG/DL (ref 0–1.2)
BILIRUB UR STRIP.AUTO-MCNC: NEGATIVE MG/DL
BUN SERPL-MCNC: 14 MG/DL (ref 6–23)
CALCIUM SERPL-MCNC: 9.2 MG/DL (ref 8.6–10.3)
CHLORIDE SERPL-SCNC: 102 MMOL/L (ref 98–107)
CO2 SERPL-SCNC: 27 MMOL/L (ref 21–32)
COLOR UR: YELLOW
CREAT SERPL-MCNC: 0.85 MG/DL (ref 0.5–1.05)
EGFRCR SERPLBLD CKD-EPI 2021: >90 ML/MIN/1.73M*2
EOSINOPHIL # BLD AUTO: 0.24 X10*3/UL (ref 0–0.7)
EOSINOPHIL NFR BLD AUTO: 3.6 %
ERYTHROCYTE [DISTWIDTH] IN BLOOD BY AUTOMATED COUNT: 12.8 % (ref 11.5–14.5)
GLUCOSE SERPL-MCNC: 85 MG/DL (ref 74–99)
GLUCOSE UR STRIP.AUTO-MCNC: NORMAL MG/DL
HCG UR QL IA.RAPID: NEGATIVE
HCT VFR BLD AUTO: 43.6 % (ref 36–46)
HGB BLD-MCNC: 14.4 G/DL (ref 12–16)
IMM GRANULOCYTES # BLD AUTO: 0.02 X10*3/UL (ref 0–0.7)
IMM GRANULOCYTES NFR BLD AUTO: 0.3 % (ref 0–0.9)
KETONES UR STRIP.AUTO-MCNC: NEGATIVE MG/DL
LACTATE SERPL-SCNC: 0.6 MMOL/L (ref 0.4–2)
LEUKOCYTE ESTERASE UR QL STRIP.AUTO: ABNORMAL
LIPASE SERPL-CCNC: 39 U/L (ref 9–82)
LYMPHOCYTES # BLD AUTO: 1.7 X10*3/UL (ref 1.2–4.8)
LYMPHOCYTES NFR BLD AUTO: 25.4 %
MCH RBC QN AUTO: 29 PG (ref 26–34)
MCHC RBC AUTO-ENTMCNC: 33 G/DL (ref 32–36)
MCV RBC AUTO: 88 FL (ref 80–100)
MONOCYTES # BLD AUTO: 0.76 X10*3/UL (ref 0.1–1)
MONOCYTES NFR BLD AUTO: 11.3 %
MUCOUS THREADS #/AREA URNS AUTO: NORMAL /LPF
NEUTROPHILS # BLD AUTO: 3.94 X10*3/UL (ref 1.2–7.7)
NEUTROPHILS NFR BLD AUTO: 58.8 %
NITRITE UR QL STRIP.AUTO: NEGATIVE
NRBC BLD-RTO: 0 /100 WBCS (ref 0–0)
PH UR STRIP.AUTO: 6.5 [PH]
PLATELET # BLD AUTO: 270 X10*3/UL (ref 150–450)
POTASSIUM SERPL-SCNC: 4 MMOL/L (ref 3.5–5.3)
PROT SERPL-MCNC: 7.2 G/DL (ref 6.4–8.2)
PROT UR STRIP.AUTO-MCNC: ABNORMAL MG/DL
RBC # BLD AUTO: 4.97 X10*6/UL (ref 4–5.2)
RBC # UR STRIP.AUTO: NEGATIVE /UL
RBC #/AREA URNS AUTO: NORMAL /HPF
SODIUM SERPL-SCNC: 136 MMOL/L (ref 136–145)
SP GR UR STRIP.AUTO: 1.02
SQUAMOUS #/AREA URNS AUTO: NORMAL /HPF
UROBILINOGEN UR STRIP.AUTO-MCNC: NORMAL MG/DL
WBC # BLD AUTO: 6.7 X10*3/UL (ref 4.4–11.3)
WBC #/AREA URNS AUTO: NORMAL /HPF

## 2024-10-28 PROCEDURE — 2500000004 HC RX 250 GENERAL PHARMACY W/ HCPCS (ALT 636 FOR OP/ED)

## 2024-10-28 PROCEDURE — 87086 URINE CULTURE/COLONY COUNT: CPT | Mod: PORLAB | Performed by: NURSE PRACTITIONER

## 2024-10-28 PROCEDURE — 85025 COMPLETE CBC W/AUTO DIFF WBC: CPT | Performed by: NURSE PRACTITIONER

## 2024-10-28 PROCEDURE — 2500000004 HC RX 250 GENERAL PHARMACY W/ HCPCS (ALT 636 FOR OP/ED): Performed by: NURSE PRACTITIONER

## 2024-10-28 PROCEDURE — 96375 TX/PRO/DX INJ NEW DRUG ADDON: CPT

## 2024-10-28 PROCEDURE — 96374 THER/PROPH/DIAG INJ IV PUSH: CPT

## 2024-10-28 PROCEDURE — 76705 ECHO EXAM OF ABDOMEN: CPT | Performed by: STUDENT IN AN ORGANIZED HEALTH CARE EDUCATION/TRAINING PROGRAM

## 2024-10-28 PROCEDURE — 36415 COLL VENOUS BLD VENIPUNCTURE: CPT | Performed by: NURSE PRACTITIONER

## 2024-10-28 PROCEDURE — 80053 COMPREHEN METABOLIC PANEL: CPT | Performed by: NURSE PRACTITIONER

## 2024-10-28 PROCEDURE — 76705 ECHO EXAM OF ABDOMEN: CPT

## 2024-10-28 PROCEDURE — 83605 ASSAY OF LACTIC ACID: CPT | Performed by: NURSE PRACTITIONER

## 2024-10-28 PROCEDURE — 81001 URINALYSIS AUTO W/SCOPE: CPT | Performed by: NURSE PRACTITIONER

## 2024-10-28 PROCEDURE — 99284 EMERGENCY DEPT VISIT MOD MDM: CPT | Mod: 25

## 2024-10-28 PROCEDURE — 83690 ASSAY OF LIPASE: CPT | Performed by: NURSE PRACTITIONER

## 2024-10-28 PROCEDURE — 81025 URINE PREGNANCY TEST: CPT | Performed by: NURSE PRACTITIONER

## 2024-10-28 RX ORDER — FAMOTIDINE 10 MG/ML
20 INJECTION INTRAVENOUS ONCE
Status: COMPLETED | OUTPATIENT
Start: 2024-10-28 | End: 2024-10-28

## 2024-10-28 RX ORDER — ONDANSETRON HYDROCHLORIDE 2 MG/ML
4 INJECTION, SOLUTION INTRAVENOUS ONCE
Status: COMPLETED | OUTPATIENT
Start: 2024-10-28 | End: 2024-10-28

## 2024-10-28 RX ORDER — KETOROLAC TROMETHAMINE 30 MG/ML
INJECTION, SOLUTION INTRAMUSCULAR; INTRAVENOUS
Status: COMPLETED
Start: 2024-10-28 | End: 2024-10-28

## 2024-10-28 RX ORDER — KETOROLAC TROMETHAMINE 30 MG/ML
15 INJECTION, SOLUTION INTRAMUSCULAR; INTRAVENOUS ONCE
Status: COMPLETED | OUTPATIENT
Start: 2024-10-28 | End: 2024-10-28

## 2024-10-28 RX ORDER — FAMOTIDINE 20 MG/1
20 TABLET, FILM COATED ORAL 2 TIMES DAILY
Qty: 14 TABLET | Refills: 0 | Status: SHIPPED | OUTPATIENT
Start: 2024-10-28 | End: 2024-11-04

## 2024-10-28 ASSESSMENT — VISUAL ACUITY: OU: 1

## 2024-10-28 ASSESSMENT — PAIN DESCRIPTION - ORIENTATION: ORIENTATION: RIGHT;UPPER

## 2024-10-28 ASSESSMENT — LIFESTYLE VARIABLES
TOTAL SCORE: 0
EVER HAD A DRINK FIRST THING IN THE MORNING TO STEADY YOUR NERVES TO GET RID OF A HANGOVER: NO
HAVE PEOPLE ANNOYED YOU BY CRITICIZING YOUR DRINKING: NO
EVER FELT BAD OR GUILTY ABOUT YOUR DRINKING: NO
HAVE YOU EVER FELT YOU SHOULD CUT DOWN ON YOUR DRINKING: NO

## 2024-10-28 ASSESSMENT — PAIN DESCRIPTION - LOCATION: LOCATION: ABDOMEN

## 2024-10-28 ASSESSMENT — COLUMBIA-SUICIDE SEVERITY RATING SCALE - C-SSRS
6. HAVE YOU EVER DONE ANYTHING, STARTED TO DO ANYTHING, OR PREPARED TO DO ANYTHING TO END YOUR LIFE?: NO
1. IN THE PAST MONTH, HAVE YOU WISHED YOU WERE DEAD OR WISHED YOU COULD GO TO SLEEP AND NOT WAKE UP?: NO
2. HAVE YOU ACTUALLY HAD ANY THOUGHTS OF KILLING YOURSELF?: NO

## 2024-10-28 ASSESSMENT — PAIN - FUNCTIONAL ASSESSMENT: PAIN_FUNCTIONAL_ASSESSMENT: 0-10

## 2024-10-28 ASSESSMENT — PAIN DESCRIPTION - PAIN TYPE: TYPE: ACUTE PAIN

## 2024-10-28 ASSESSMENT — PAIN DESCRIPTION - DESCRIPTORS: DESCRIPTORS: SQUEEZING

## 2024-10-28 ASSESSMENT — PAIN SCALES - GENERAL
PAINLEVEL_OUTOF10: 6
PAINLEVEL_OUTOF10: 7

## 2024-10-29 LAB — HOLD SPECIMEN: NORMAL

## 2024-10-30 LAB — BACTERIA UR CULT: NORMAL

## 2024-11-13 ENCOUNTER — OFFICE VISIT (OUTPATIENT)
Dept: GASTROENTEROLOGY | Facility: CLINIC | Age: 24
End: 2024-11-13
Payer: COMMERCIAL

## 2024-11-13 VITALS
WEIGHT: 157.2 LBS | TEMPERATURE: 98.1 F | HEART RATE: 80 BPM | HEIGHT: 62 IN | DIASTOLIC BLOOD PRESSURE: 70 MMHG | BODY MASS INDEX: 28.93 KG/M2 | SYSTOLIC BLOOD PRESSURE: 106 MMHG

## 2024-11-13 DIAGNOSIS — R10.11 RUQ ABDOMINAL PAIN: ICD-10-CM

## 2024-11-13 DIAGNOSIS — K83.8 DILATED CBD, ACQUIRED: ICD-10-CM

## 2024-11-13 DIAGNOSIS — H53.149 PHOTOPHOBIA: ICD-10-CM

## 2024-11-13 DIAGNOSIS — R10.10 PAIN OF UPPER ABDOMEN: ICD-10-CM

## 2024-11-13 DIAGNOSIS — R11.0 NAUSEA: ICD-10-CM

## 2024-11-13 DIAGNOSIS — R68.81 EARLY SATIETY: ICD-10-CM

## 2024-11-13 DIAGNOSIS — R51.9 NONINTRACTABLE HEADACHE, UNSPECIFIED CHRONICITY PATTERN, UNSPECIFIED HEADACHE TYPE: Primary | ICD-10-CM

## 2024-11-13 DIAGNOSIS — R63.0 ANOREXIA: ICD-10-CM

## 2024-11-13 PROCEDURE — 3008F BODY MASS INDEX DOCD: CPT | Performed by: NURSE PRACTITIONER

## 2024-11-13 PROCEDURE — 99244 OFF/OP CNSLTJ NEW/EST MOD 40: CPT | Performed by: NURSE PRACTITIONER

## 2024-11-13 PROCEDURE — 99214 OFFICE O/P EST MOD 30 MIN: CPT | Performed by: NURSE PRACTITIONER

## 2024-11-13 RX ORDER — BENZONATATE 200 MG/1
CAPSULE ORAL
COMMUNITY
Start: 2024-09-06 | End: 2024-11-13

## 2024-11-13 RX ORDER — PANTOPRAZOLE SODIUM 40 MG/1
1 TABLET, DELAYED RELEASE ORAL
COMMUNITY
Start: 2024-10-23 | End: 2024-11-13

## 2024-11-13 RX ORDER — SUCRALFATE 1 G/1
1 TABLET ORAL EVERY 6 HOURS
COMMUNITY
Start: 2024-10-23 | End: 2024-11-13

## 2024-11-13 ASSESSMENT — ENCOUNTER SYMPTOMS
SHORTNESS OF BREATH: 0
DYSURIA: 0
AGITATION: 0
NERVOUS/ANXIOUS: 0
PALPITATIONS: 0
LIGHT-HEADEDNESS: 0
HEMATURIA: 0
MYALGIAS: 0
EYE PAIN: 0
SORE THROAT: 0
FEVER: 0
CHILLS: 0
FATIGUE: 0
ARTHRALGIAS: 0
ADENOPATHY: 0
HALLUCINATIONS: 0
DIAPHORESIS: 0
JOINT SWELLING: 0
DIZZINESS: 0
FREQUENCY: 0
WHEEZING: 0
BACK PAIN: 0
FLANK PAIN: 0
COUGH: 0
NUMBNESS: 0
WEAKNESS: 0

## 2024-11-13 ASSESSMENT — PAIN SCALES - GENERAL: PAINLEVEL_OUTOF10: 6

## 2024-11-13 NOTE — PROGRESS NOTES
"Subjective   Patient ID: Irma Archibald is a 24 y.o. female who presents for abdominal pain.     This is a 24 year old WF with history of nicotine/marijuana use and asthma  who is presenting to the GI clinic for an initial visit.    History per pt, fiance,  and review of EMR     Pt is accompanied to this visit by her fiance     On 10/28/24 pt was seen in the [facility] ED for epigastric/RUQ abdominal pain. RUQ US at that time noted a \"mildly prominent for age extrahepatic bile duct measuring 5 mm\". Labs at that time including CBC, CMP, and lipase were normal. She was referred to GI.     Reported being seen at [facility] ED a few days prior to [facility] ED visit where she was diagnosed with gastritis. CT A/P done at that time.  No records of this ED visit are available.     Reports a month ago she began having a loss of appetite, early satiety, nausea, RUQ abdominal pain, and a headache with photophobia.     Denies prior history of headaches.     The RUQ abdominal pain is sharp to dull. The pain occasionally radiates to the epigastrium. The pain lasts an hour or so and is brought on by any foods. The pain does not change with defecation or passage of flatulence.     Having 1-2 formed stools per day.     ROS positive for a 3.5 lb unintentional weight loss in the past month.     Denies vomiting, diarrhea, constipation, hematemesis, hematochezia, and melena.     No recent use of NSAIDs.     Past medical history:   See above  Concussion in 9/2023 per pt     Past surgical history:  None    Family history:   Mother- stomach cancer     Social history:  Vapes nicotine   Smokes marijuana 4-5 times a week   Denies use of alcohol   Has 4 children       Review of Systems   Constitutional:  Negative for chills, diaphoresis, fatigue and fever.   HENT:  Negative for congestion, ear pain, hearing loss, sneezing and sore throat.    Eyes:  Negative for pain and visual disturbance.   Respiratory:  Negative " "for cough, shortness of breath and wheezing.    Cardiovascular:  Negative for chest pain, palpitations and leg swelling.   Endocrine: Negative for cold intolerance and heat intolerance.   Genitourinary:  Negative for dysuria, flank pain, frequency and hematuria.   Musculoskeletal:  Negative for arthralgias, back pain, gait problem, joint swelling and myalgias.   Skin:  Negative for rash.   Neurological:  Negative for dizziness, syncope, weakness, light-headedness and numbness.   Hematological:  Negative for adenopathy.   Psychiatric/Behavioral:  Negative for agitation and hallucinations. The patient is not nervous/anxious.        Allergies   Allergen Reactions    Decadron [Dexamethasone] Dizziness     Current Outpatient Medications   Medication Sig Dispense Refill    albuterol 90 mcg/actuation aerosol powdr breath activated inhaler Inhale.      levonorgestrel (Mirena) 21 mcg/24 hr (8 yrs) 52 mg IUD 52 mg by intrauterine route 1 time. Inserted 06/10/2024       No current facility-administered medications for this visit.        Objective     /70   Pulse 80   Temp 36.7 °C (98.1 °F) (Temporal)   Ht 1.575 m (5' 2\")   Wt 71.3 kg (157 lb 3.2 oz)   LMP 10/13/2024 (Approximate)   BMI 28.75 kg/m²     Physical Exam  Constitutional:       General: She is not in acute distress.     Appearance: Normal appearance.   HENT:      Head: Normocephalic and atraumatic.   Eyes:      Extraocular Movements: Extraocular movements intact.      Conjunctiva/sclera: Conjunctivae normal.      Pupils: Pupils are equal, round, and reactive to light.   Cardiovascular:      Rate and Rhythm: Normal rate and regular rhythm.      Heart sounds: No murmur heard.     No gallop.   Pulmonary:      Effort: Pulmonary effort is normal.      Breath sounds: Normal breath sounds.   Abdominal:      General: Bowel sounds are normal. There is no distension.      Tenderness: There is no abdominal tenderness. There is no guarding.   Musculoskeletal:         " General: No swelling or deformity. Normal range of motion.      Cervical back: Normal range of motion. No rigidity.   Skin:     General: Skin is warm and dry.      Coloration: Skin is not jaundiced.      Findings: No lesion or rash.   Neurological:      General: No focal deficit present.      Mental Status: She is alert and oriented to person, place, and time.   Psychiatric:         Mood and Affect: Mood normal.         Assessment/Plan   Problem List Items Addressed This Visit    None  Visit Diagnoses       Nonintractable headache, unspecified chronicity pattern, unspecified headache type    -  Primary    Relevant Orders    CT head wo IV contrast    Pain of upper abdomen        Dilated cbd, acquired        Photophobia        Relevant Orders    CT head wo IV contrast    Early satiety        Relevant Orders    Esophagogastroduodenoscopy (EGD)    Anorexia        Relevant Orders    Esophagogastroduodenoscopy (EGD)    RUQ abdominal pain        Relevant Orders    Esophagogastroduodenoscopy (EGD)    Nausea        Relevant Orders    Esophagogastroduodenoscopy (EGD)           Anorexia, early satiety, nausea, RUQ abdominal pain, headache with photophobia: this is obviously an atypical constellation of complaints. Potentially multifactorial in nature. Consider gastric malignancy with family history. New onset headaches with neurologic symptom warrants evaluation for mass.   - will proceed with EGD  - will proceed with CT head w/o contrast   - will attempt to get records from recent OSH ED visit     2. Extrahepatic bile duct prominence on recent CT: LFTs normal.   - plan for surveillance RUQ abdominal US in 6-8 months   - discussed with my colleague Dr. Bueno     3. Follow up:  - return to clinic 3 weeks after completion of the above testing

## 2024-11-13 NOTE — PATIENT INSTRUCTIONS
Thanks for coming to the GI clinic.     I would like you to get an EGD. Please call [phone] to schedule. You can also call [phone] to schedule.     I would like you to get a head CT. Please call [phone] to schedule.     Follow up with me in clinic 3 weeks after completion of the above testing.

## 2024-11-13 NOTE — H&P (VIEW-ONLY)
"Subjective   Patient ID: Irma Archibald is a 24 y.o. female who presents for abdominal pain.     This is a 24 year old WF with history of nicotine/marijuana use and asthma  who is presenting to the GI clinic for an initial visit.    History per pt, fiance,  and review of EMR     Pt is accompanied to this visit by her fiance     On 10/28/24 pt was seen in the [facility] ED for epigastric/RUQ abdominal pain. RUQ US at that time noted a \"mildly prominent for age extrahepatic bile duct measuring 5 mm\". Labs at that time including CBC, CMP, and lipase were normal. She was referred to GI.     Reported being seen at [facility] ED a few days prior to [facility] ED visit where she was diagnosed with gastritis. CT A/P done at that time.  No records of this ED visit are available.     Reports a month ago she began having a loss of appetite, early satiety, nausea, RUQ abdominal pain, and a headache with photophobia.     Denies prior history of headaches.     The RUQ abdominal pain is sharp to dull. The pain occasionally radiates to the epigastrium. The pain lasts an hour or so and is brought on by any foods. The pain does not change with defecation or passage of flatulence.     Having 1-2 formed stools per day.     ROS positive for a 3.5 lb unintentional weight loss in the past month.     Denies vomiting, diarrhea, constipation, hematemesis, hematochezia, and melena.     No recent use of NSAIDs.     Past medical history:   See above  Concussion in 9/2023 per pt     Past surgical history:  None    Family history:   Mother- stomach cancer     Social history:  Vapes nicotine   Smokes marijuana 4-5 times a week   Denies use of alcohol   Has 4 children       Review of Systems   Constitutional:  Negative for chills, diaphoresis, fatigue and fever.   HENT:  Negative for congestion, ear pain, hearing loss, sneezing and sore throat.    Eyes:  Negative for pain and visual disturbance.   Respiratory:  Negative " "for cough, shortness of breath and wheezing.    Cardiovascular:  Negative for chest pain, palpitations and leg swelling.   Endocrine: Negative for cold intolerance and heat intolerance.   Genitourinary:  Negative for dysuria, flank pain, frequency and hematuria.   Musculoskeletal:  Negative for arthralgias, back pain, gait problem, joint swelling and myalgias.   Skin:  Negative for rash.   Neurological:  Negative for dizziness, syncope, weakness, light-headedness and numbness.   Hematological:  Negative for adenopathy.   Psychiatric/Behavioral:  Negative for agitation and hallucinations. The patient is not nervous/anxious.        Allergies   Allergen Reactions    Decadron [Dexamethasone] Dizziness     Current Outpatient Medications   Medication Sig Dispense Refill    albuterol 90 mcg/actuation aerosol powdr breath activated inhaler Inhale.      levonorgestrel (Mirena) 21 mcg/24 hr (8 yrs) 52 mg IUD 52 mg by intrauterine route 1 time. Inserted 06/10/2024       No current facility-administered medications for this visit.        Objective     /70   Pulse 80   Temp 36.7 °C (98.1 °F) (Temporal)   Ht 1.575 m (5' 2\")   Wt 71.3 kg (157 lb 3.2 oz)   LMP 10/13/2024 (Approximate)   BMI 28.75 kg/m²     Physical Exam  Constitutional:       General: She is not in acute distress.     Appearance: Normal appearance.   HENT:      Head: Normocephalic and atraumatic.   Eyes:      Extraocular Movements: Extraocular movements intact.      Conjunctiva/sclera: Conjunctivae normal.      Pupils: Pupils are equal, round, and reactive to light.   Cardiovascular:      Rate and Rhythm: Normal rate and regular rhythm.      Heart sounds: No murmur heard.     No gallop.   Pulmonary:      Effort: Pulmonary effort is normal.      Breath sounds: Normal breath sounds.   Abdominal:      General: Bowel sounds are normal. There is no distension.      Tenderness: There is no abdominal tenderness. There is no guarding.   Musculoskeletal:         " General: No swelling or deformity. Normal range of motion.      Cervical back: Normal range of motion. No rigidity.   Skin:     General: Skin is warm and dry.      Coloration: Skin is not jaundiced.      Findings: No lesion or rash.   Neurological:      General: No focal deficit present.      Mental Status: She is alert and oriented to person, place, and time.   Psychiatric:         Mood and Affect: Mood normal.         Assessment/Plan   Problem List Items Addressed This Visit    None  Visit Diagnoses       Nonintractable headache, unspecified chronicity pattern, unspecified headache type    -  Primary    Relevant Orders    CT head wo IV contrast    Pain of upper abdomen        Dilated cbd, acquired        Photophobia        Relevant Orders    CT head wo IV contrast    Early satiety        Relevant Orders    Esophagogastroduodenoscopy (EGD)    Anorexia        Relevant Orders    Esophagogastroduodenoscopy (EGD)    RUQ abdominal pain        Relevant Orders    Esophagogastroduodenoscopy (EGD)    Nausea        Relevant Orders    Esophagogastroduodenoscopy (EGD)           Anorexia, early satiety, nausea, RUQ abdominal pain, headache with photophobia: this is obviously an atypical constellation of complaints. Potentially multifactorial in nature. Consider gastric malignancy with family history. New onset headaches with neurologic symptom warrants evaluation for mass.   - will proceed with EGD  - will proceed with CT head w/o contrast   - will attempt to get records from recent OSH ED visit     2. Extrahepatic bile duct prominence on recent CT: LFTs normal.   - plan for surveillance RUQ abdominal US in 6-8 months   - discussed with my colleague Dr. Bueno     3. Follow up:  - return to clinic 3 weeks after completion of the above testing

## 2024-11-15 ENCOUNTER — APPOINTMENT (OUTPATIENT)
Dept: OBSTETRICS AND GYNECOLOGY | Facility: CLINIC | Age: 24
End: 2024-11-15
Payer: COMMERCIAL

## 2024-11-19 ENCOUNTER — TELEPHONE (OUTPATIENT)
Facility: CLINIC | Age: 24
End: 2024-11-19
Payer: COMMERCIAL

## 2024-11-19 NOTE — TELEPHONE ENCOUNTER
----- Message from Loren ARCE sent at 11/19/2024 10:16 AM EST -----  Pt seen by Dr. Fabian.   Wants EGD at [place].   Please advise on department / meds to hold

## 2024-11-20 ENCOUNTER — TELEPHONE (OUTPATIENT)
Dept: GASTROENTEROLOGY | Facility: CLINIC | Age: 24
End: 2024-11-20
Payer: COMMERCIAL

## 2024-11-20 NOTE — TELEPHONE ENCOUNTER
----- Message from Gabi Nolan sent at 11/19/2024  4:04 PM EST -----  Endo; no meds to hold  ----- Message -----  From: Loren Isaacs MA  Sent: 11/19/2024  10:17 AM EST  To: Gabi Nolan, APRN-CNP; #    Pt seen by Dr. Fabian.   Wants EGD at [place].   Please advise on department / meds to hold

## 2024-11-25 ENCOUNTER — PREP FOR PROCEDURE (OUTPATIENT)
Dept: GASTROENTEROLOGY | Facility: CLINIC | Age: 24
End: 2024-11-25
Payer: COMMERCIAL

## 2024-11-27 ENCOUNTER — ANESTHESIA EVENT (OUTPATIENT)
Dept: GASTROENTEROLOGY | Facility: HOSPITAL | Age: 24
End: 2024-11-27
Payer: COMMERCIAL

## 2024-11-27 ENCOUNTER — ANESTHESIA (OUTPATIENT)
Dept: GASTROENTEROLOGY | Facility: HOSPITAL | Age: 24
End: 2024-11-27
Payer: COMMERCIAL

## 2024-11-27 ENCOUNTER — HOSPITAL ENCOUNTER (OUTPATIENT)
Dept: GASTROENTEROLOGY | Facility: HOSPITAL | Age: 24
Discharge: HOME | End: 2024-11-27
Payer: COMMERCIAL

## 2024-11-27 VITALS
HEART RATE: 65 BPM | TEMPERATURE: 97.3 F | HEIGHT: 62 IN | DIASTOLIC BLOOD PRESSURE: 56 MMHG | RESPIRATION RATE: 16 BRPM | SYSTOLIC BLOOD PRESSURE: 102 MMHG | WEIGHT: 158 LBS | OXYGEN SATURATION: 100 % | BODY MASS INDEX: 29.08 KG/M2

## 2024-11-27 DIAGNOSIS — R10.11 RUQ ABDOMINAL PAIN: ICD-10-CM

## 2024-11-27 DIAGNOSIS — R68.81 EARLY SATIETY: ICD-10-CM

## 2024-11-27 DIAGNOSIS — R11.0 NAUSEA: ICD-10-CM

## 2024-11-27 DIAGNOSIS — R63.0 ANOREXIA: ICD-10-CM

## 2024-11-27 PROBLEM — J45.909 ASTHMA: Status: ACTIVE | Noted: 2024-11-27

## 2024-11-27 LAB — PREGNANCY TEST URINE, POC: NEGATIVE

## 2024-11-27 PROCEDURE — 43239 EGD BIOPSY SINGLE/MULTIPLE: CPT | Performed by: INTERNAL MEDICINE

## 2024-11-27 PROCEDURE — 7100000009 HC PHASE TWO TIME - INITIAL BASE CHARGE

## 2024-11-27 PROCEDURE — 7100000010 HC PHASE TWO TIME - EACH INCREMENTAL 1 MINUTE

## 2024-11-27 PROCEDURE — 3700000002 HC GENERAL ANESTHESIA TIME - EACH INCREMENTAL 1 MINUTE

## 2024-11-27 PROCEDURE — 3700000001 HC GENERAL ANESTHESIA TIME - INITIAL BASE CHARGE

## 2024-11-27 PROCEDURE — 2500000004 HC RX 250 GENERAL PHARMACY W/ HCPCS (ALT 636 FOR OP/ED): Performed by: NURSE ANESTHETIST, CERTIFIED REGISTERED

## 2024-11-27 PROCEDURE — 81025 URINE PREGNANCY TEST: CPT | Performed by: INTERNAL MEDICINE

## 2024-11-27 RX ORDER — PROPOFOL 10 MG/ML
INJECTION, EMULSION INTRAVENOUS AS NEEDED
Status: DISCONTINUED | OUTPATIENT
Start: 2024-11-27 | End: 2024-11-27

## 2024-11-27 RX ORDER — FENTANYL CITRATE 50 UG/ML
INJECTION, SOLUTION INTRAMUSCULAR; INTRAVENOUS AS NEEDED
Status: DISCONTINUED | OUTPATIENT
Start: 2024-11-27 | End: 2024-11-27

## 2024-11-27 RX ORDER — LIDOCAINE HYDROCHLORIDE 20 MG/ML
INJECTION, SOLUTION INFILTRATION; PERINEURAL AS NEEDED
Status: DISCONTINUED | OUTPATIENT
Start: 2024-11-27 | End: 2024-11-27

## 2024-11-27 SDOH — HEALTH STABILITY: MENTAL HEALTH: CURRENT SMOKER: 1

## 2024-11-27 ASSESSMENT — COLUMBIA-SUICIDE SEVERITY RATING SCALE - C-SSRS
1. IN THE PAST MONTH, HAVE YOU WISHED YOU WERE DEAD OR WISHED YOU COULD GO TO SLEEP AND NOT WAKE UP?: NO
2. HAVE YOU ACTUALLY HAD ANY THOUGHTS OF KILLING YOURSELF?: NO
6. HAVE YOU EVER DONE ANYTHING, STARTED TO DO ANYTHING, OR PREPARED TO DO ANYTHING TO END YOUR LIFE?: NO

## 2024-11-27 ASSESSMENT — PAIN SCALES - GENERAL
PAINLEVEL_OUTOF10: 0 - NO PAIN
PAINLEVEL_OUTOF10: 0 - NO PAIN
PAIN_LEVEL: 0

## 2024-11-27 ASSESSMENT — PAIN - FUNCTIONAL ASSESSMENT
PAIN_FUNCTIONAL_ASSESSMENT: 0-10
PAIN_FUNCTIONAL_ASSESSMENT: 0-10

## 2024-11-27 NOTE — ANESTHESIA PREPROCEDURE EVALUATION
Patient: Irma Archibald    Procedure Information       Anesthesia Start Date/Time: 11/27/24 0851    Scheduled providers: Dennis Cabrera MD    Procedure: EGD    Location: [facility]            Relevant Problems   Anesthesia (within normal limits)      Pulmonary   (+) Asthma      GI (within normal limits)      /Renal (within normal limits)      Liver (within normal limits)      Hematology (within normal limits)      HEENT (within normal limits)       Clinical information reviewed:   Tobacco  Allergies  Meds   Med Hx  Surg Hx  OB Status  Fam Hx  Soc   Hx        NPO Detail:  NPO/Void Status  Carbohydrate Drink Given Prior to Surgery? : N  Date of Last Liquid: 11/26/24  Time of Last Liquid: 2030  Date of Last Solid: 11/26/24  Time of Last Solid: 2030  Last Intake Type: Food  Time of Last Void: 0833         Physical Exam    Airway  Mallampati: II  TM distance: >3 FB  Neck ROM: full     Cardiovascular - normal exam  Rhythm: regular  Rate: normal     Dental - normal exam     Pulmonary - normal exam     Abdominal - normal exam             Anesthesia Plan    History of general anesthesia?: yes  History of complications of general anesthesia?: no    ASA 2     MAC     The patient is a current smoker.  Patient was previously instructed to abstain from smoking on day of procedure.  Patient did not smoke on day of procedure.    intravenous induction   Anesthetic plan and risks discussed with patient.

## 2024-11-27 NOTE — ANESTHESIA POSTPROCEDURE EVALUATION
Patient: Irma Archibald    Procedure Summary       Date: 11/27/24 Room / Location: [facility]    Anesthesia Start: 0851 Anesthesia Stop: 0913    Procedure: EGD Diagnosis:       Early satiety      Anorexia      RUQ abdominal pain      Nausea    Scheduled Providers: Dennis Cabrera MD Responsible Provider: JE Carpenter    Anesthesia Type: MAC ASA Status: 2            Anesthesia Type: MAC    Vitals Value Taken Time   /56 11/27/24 0931   Temp 36.3 °C (97.3 °F) 11/27/24 0931   Pulse 65 11/27/24 0931   Resp 16 11/27/24 0931   SpO2 100 % 11/27/24 0931       Anesthesia Post Evaluation    Patient location during evaluation: bedside  Patient participation: complete - patient participated  Level of consciousness: awake and alert  Pain score: 0  Pain management: adequate  Airway patency: patent  Cardiovascular status: acceptable and hemodynamically stable  Respiratory status: acceptable  Hydration status: acceptable  Postoperative Nausea and Vomiting: none        There were no known notable events for this encounter.

## 2024-12-03 ENCOUNTER — E-VISIT (OUTPATIENT)
Dept: PRIMARY CARE | Facility: CLINIC | Age: 24
End: 2024-12-03
Payer: COMMERCIAL

## 2024-12-03 DIAGNOSIS — J32.9 BACTERIAL SINUSITIS: Primary | ICD-10-CM

## 2024-12-03 DIAGNOSIS — B96.89 BACTERIAL SINUSITIS: Primary | ICD-10-CM

## 2024-12-03 RX ORDER — AZITHROMYCIN 250 MG/1
TABLET, FILM COATED ORAL
Qty: 6 TABLET | Refills: 0 | Status: SHIPPED | OUTPATIENT
Start: 2024-12-03 | End: 2024-12-08

## 2024-12-03 ASSESSMENT — LIFESTYLE VARIABLES: HISTORY_OF_SMOKING: I AM A CURRENT SMOKER

## 2024-12-03 NOTE — TELEPHONE ENCOUNTER
E-visit  Questionnaire, chart reviewed  Sinusitis  A prescription was sent to the pharmacy.   Handouts provided via [employer]

## 2024-12-18 ENCOUNTER — APPOINTMENT (OUTPATIENT)
Dept: RADIOLOGY | Facility: HOSPITAL | Age: 24
End: 2024-12-18
Payer: COMMERCIAL

## 2025-03-19 ENCOUNTER — APPOINTMENT (OUTPATIENT)
Dept: OBSTETRICS AND GYNECOLOGY | Facility: CLINIC | Age: 25
End: 2025-03-19
Payer: COMMERCIAL

## 2025-04-02 ENCOUNTER — APPOINTMENT (OUTPATIENT)
Dept: OBSTETRICS AND GYNECOLOGY | Facility: CLINIC | Age: 25
End: 2025-04-02

## 2025-05-22 ENCOUNTER — APPOINTMENT (OUTPATIENT)
Dept: OBSTETRICS AND GYNECOLOGY | Facility: CLINIC | Age: 25
End: 2025-05-22
Payer: COMMERCIAL

## 2025-05-22 VITALS — SYSTOLIC BLOOD PRESSURE: 110 MMHG | BODY MASS INDEX: 29.63 KG/M2 | WEIGHT: 162 LBS | DIASTOLIC BLOOD PRESSURE: 72 MMHG

## 2025-05-22 DIAGNOSIS — R35.0 URINARY FREQUENCY: ICD-10-CM

## 2025-05-22 DIAGNOSIS — Z12.4 SCREENING FOR CERVICAL CANCER: ICD-10-CM

## 2025-05-22 DIAGNOSIS — Z11.51 SCREENING FOR HPV (HUMAN PAPILLOMAVIRUS): ICD-10-CM

## 2025-05-22 DIAGNOSIS — Z01.419 ENCOUNTER FOR WELL WOMAN EXAM WITH ROUTINE GYNECOLOGICAL EXAM: Primary | ICD-10-CM

## 2025-05-22 DIAGNOSIS — Z11.3 SCREEN FOR STD (SEXUALLY TRANSMITTED DISEASE): ICD-10-CM

## 2025-05-22 DIAGNOSIS — L70.9 ACNE, UNSPECIFIED ACNE TYPE: ICD-10-CM

## 2025-05-22 LAB
POC APPEARANCE, URINE: CLEAR
POC BILIRUBIN, URINE: NEGATIVE
POC BLOOD, URINE: NEGATIVE
POC COLOR, URINE: ABNORMAL
POC GLUCOSE, URINE: NEGATIVE MG/DL
POC KETONES, URINE: NEGATIVE MG/DL
POC LEUKOCYTES, URINE: ABNORMAL
POC NITRITE,URINE: NEGATIVE
POC PH, URINE: 6.5 PH
POC PROTEIN, URINE: NEGATIVE MG/DL
POC SPECIFIC GRAVITY, URINE: 1.01
POC UROBILINOGEN, URINE: 1 EU/DL

## 2025-05-22 PROCEDURE — 88141 CYTOPATH C/V INTERPRET: CPT | Performed by: PATHOLOGY

## 2025-05-22 PROCEDURE — 87591 N.GONORRHOEAE DNA AMP PROB: CPT

## 2025-05-22 PROCEDURE — 87491 CHLMYD TRACH DNA AMP PROBE: CPT

## 2025-05-22 PROCEDURE — 88175 CYTOPATH C/V AUTO FLUID REDO: CPT

## 2025-05-22 RX ORDER — SPIRONOLACTONE 50 MG/1
50 TABLET, FILM COATED ORAL DAILY
Qty: 30 TABLET | Refills: 11 | Status: SHIPPED | OUTPATIENT
Start: 2025-05-22 | End: 2026-05-22

## 2025-05-22 NOTE — PROGRESS NOTES
HPI:   Irma Archibald is a 25 y.o. who presents today for her annual gynecologic exam with complaints    She has the following concerns; Has worsening ace with her IUD.     GYN HISTORY:  Periods are irregular, lasting 7 days.   Dysmenorrhea:none. Cyclic symptoms include skin changes. .   No intermenstrual bleeding, spotting, or discharge.    Current contraception: IUD      Requests STD testing: yes -       PAP History   Last pap:   2024 ASCUS HPV Positive other  History of abnormal pap: yes - hx of abnormal and Colpo showed NEIL 1.   HPV vaccine: yes -    @paphx@    Health Screening  Family history of breast, uterine, ovarian or colon cancer: no         The patient feels safe at home.     Past Medical History:   Diagnosis Date    12 weeks gestation of pregnancy ([facility]) 02/26/2020    12 weeks gestation of pregnancy    Anxiety     Encounter for supervision of other normal pregnancy, first trimester 12/09/2021    Multigravida in first trimester    Encounter for supervision of other normal pregnancy, second trimester 05/20/2020    Multigravida in second trimester        Past Surgical History:   Procedure Laterality Date    COLPOSCOPY  06/10/2024              Review of Systems:   Constitutional: no fever and no chills.  Cardiovascular: no chest pain.   Respiratory: no shortness of breath.   Gastrointestinal: no nausea, no abdominal pain and no constipation  Genitourinary: no dysuria, no urinary incontinence, no vaginal dryness, no pelvic pain and no vaginal discharge.   Neurological: no headache.  Psychiatric: no anxiety and no depression.              Objective         /72   Wt 73.5 kg (162 lb)   LMP 05/11/2025   BMI 29.63 kg/m²         Physical Exam:   Constitutional: Alert and in no acute distress. Well developed, well nourished.      Neck: No neck asymmetry. Supple. Thyroid not enlarged and there were no palpable thyroid nodules.      Cardiovascular: Heart rate and rhythm were normal, normal S1 and  S2, no gallops, and no murmurs.      Pulmonary: No respiratory distress. Clear bilateral breath sounds.      Chest: Breasts: Normal appearance, no nipple discharge and no skin changes. Palpation of breasts and axillae: No palpable mass and no axillary lymphadenopathy.      Abdomen: Soft nontender; no abdominal mass palpated. Normal bowel sounds. No organomegaly.      Genitourinary:   - External genitalia: Normal.   - Palpation of lymph nodes in groin: No inguinal lymphadenopathy.   - Bartholin's Urethral and Skenes Glands: Normal.   - Urethra: Normal.    -Bladder: Normal on palpation.   - Vagina: Normal.   - Cervix: Normal. + IUD strings noted on exam.   - Uterus: Normal. Right Adnexa/parametria: Normal. Left Adnexa/parametria: Normal.   - Perianal Area: Normal.      Skin: Normal skin color and pigmentation, normal skin turgor, and no rash     Psychiatric: Alert and oriented x 3. Affect normal to patient baseline. Mood: Appropriate.            Assessment/Plan       Diagnoses and all orders for this visit:  Encounter for well woman exam with routine gynecological exam  Here for well woman exam. She is doing well. Periods are irregular, but she has 7-10 days of light bleeding with her IUD. Acne is worsening. She would like to try Aldactone to help with her skin. Hx of abnormal PAP and NEIL 1. PAP obtained.   Screening for cervical cancer  -     THINPREP PAP TEST (25-30)  Screening for HPV (human papillomavirus)  -     THINPREP PAP TEST (25-30)  Acne, unspecified acne type  -     spironolactone (Aldactone) 50 mg tablet; Take 1 tablet (50 mg) by mouth once daily.  Screen for STD (sexually transmitted disease)  -     THINPREP PAP TEST (25-30)  Follow-up annually; sooner if needed.        Yudy Stover, AMINATA-CNP

## 2025-05-23 LAB
C TRACH RRNA SPEC QL NAA+PROBE: NEGATIVE
N GONORRHOEA DNA SPEC QL PROBE+SIG AMP: NEGATIVE

## 2025-05-24 LAB — BACTERIA UR CULT: NORMAL

## 2025-06-17 LAB
CYTOLOGY CMNT CVX/VAG CYTO-IMP: NORMAL
LAB AP HPV GENOTYPE QUESTION: NO
LAB AP HPV HR: NORMAL
LAB AP PAP ADDITIONAL TESTS: NORMAL
LABORATORY COMMENT REPORT: NORMAL
LMP START DATE: NORMAL
PATH REPORT.TOTAL CANCER: NORMAL

## 2025-06-18 LAB — TEST COMMENT - SURGICAL SENDOUT REQUEST: NORMAL

## 2025-06-19 DIAGNOSIS — L70.9 ACNE, UNSPECIFIED ACNE TYPE: ICD-10-CM

## 2025-06-19 RX ORDER — SPIRONOLACTONE 50 MG/1
100 TABLET, FILM COATED ORAL DAILY
Qty: 60 TABLET | Refills: 11 | Status: SHIPPED | OUTPATIENT
Start: 2025-06-19 | End: 2026-06-19

## 2025-06-20 LAB
HPV HR 12 DNA GENITAL QL NAA+PROBE: POSITIVE
HPV HR GENOTYPES PNL CVX NAA+PROBE: POSITIVE
HPV16 DNA SPEC QL NAA+PROBE: NEGATIVE
HPV18 DNA SPEC QL NAA+PROBE: NEGATIVE

## 2025-07-22 ENCOUNTER — APPOINTMENT (OUTPATIENT)
Dept: OBSTETRICS AND GYNECOLOGY | Facility: CLINIC | Age: 25
End: 2025-07-22
Payer: COMMERCIAL

## 2025-07-22 VITALS — WEIGHT: 167.6 LBS | DIASTOLIC BLOOD PRESSURE: 68 MMHG | BODY MASS INDEX: 30.65 KG/M2 | SYSTOLIC BLOOD PRESSURE: 104 MMHG

## 2025-07-22 DIAGNOSIS — N87.0 DYSPLASIA OF CERVIX, LOW GRADE (CIN 1): Primary | ICD-10-CM

## 2025-07-22 DIAGNOSIS — Z32.02 PREGNANCY TEST NEGATIVE: ICD-10-CM

## 2025-07-22 DIAGNOSIS — R87.610 ASCUS WITH POSITIVE HIGH RISK HPV CERVICAL: ICD-10-CM

## 2025-07-22 DIAGNOSIS — R87.810 ASCUS WITH POSITIVE HIGH RISK HPV CERVICAL: ICD-10-CM

## 2025-07-22 LAB — PREGNANCY TEST URINE, POC: NEGATIVE

## 2025-07-22 PROCEDURE — 57454 BX/CURETT OF CERVIX W/SCOPE: CPT | Performed by: STUDENT IN AN ORGANIZED HEALTH CARE EDUCATION/TRAINING PROGRAM

## 2025-07-22 PROCEDURE — 81025 URINE PREGNANCY TEST: CPT | Performed by: STUDENT IN AN ORGANIZED HEALTH CARE EDUCATION/TRAINING PROGRAM

## 2025-07-22 NOTE — PROGRESS NOTES
Subjective   Patient ID: Irma Archibald is a 25 y.o. female who presents for Procedure (Colposcopy with possible biopsy/).  Patient is doing well, no concerns. NEIL 1 last year. LGSIL with HPV pos in 2025. She is here for a colposcopy. She has a family hx of endometriosis. She is interested in seeing a dermatologist to control her acne.    No fevers, chills. No HA/vision changes. No RUQ pain, n/v. No chest pain or SOB. No calf pain.          Review of Systems   All other systems reviewed and are negative.      Objective   Physical Exam  General: A&Ox3  Head: Normocephalic, atraumatic  Heart/Lungs: RRR, No murmurs, gallops, or rubs. Lungs are CTAB.  Breast: Normal in appearance bilaterally. No skin changes. No rashes or bruises. No palpable masses.  Abdomen: Soft, nontender. BS+4. No bruising or masses.  Genitourinary: Labia and vagina normal in appearance. Uterus is small, mobile, anteverted. No adnexal masses palpated.  Lower Extremities: No lower extremity Edema no palpable cords.     A chaperone, Theresa José, was present for the exam.    Assessment/Plan   Problem List Items Addressed This Visit       RESOLVED: ASCUS with positive high risk HPV cervical    Relevant Orders    Surgical Pathology Exam    Dysplasia of cervix, low grade (NEIL 1) - Primary    Overview   Repeat pap was LGSIL. NEIL noted 1 year ago.  Thorp performed, biopsy at 10, 1, and 6 o clock positions.             Other Visit Diagnoses         Pregnancy test negative        Relevant Orders    POCT pregnancy, urine manually resulted (Completed)            Patient ID: Irma Archibald is a 25 y.o. female.    Colposcopy    Date/Time: 7/22/2025 4:14 PM    Performed by: Juancarlos Jones MD  Authorized by: Juancarlos Jones MD    Consent:     Patient questions answered: yes      Risks and benefits of the procedure and its alternatives discussed: yes      Procedural risks discussed:  Bleeding and infection    Consent obtained:  Verbal and written     Consent given by:  Patient  Universal Protocol:     A time out verifies correct patient, procedure, equipment, support staff, and site/side marked as required: yes      Patient states understanding of procedure being performed: yes      Relevant documents present and verified: yes      Test results available and properly labeled: yes      Required blood products, implants, devices, and special equipment available: yes      Side/site marked: yes    Pre-procedure:     Prep solution(s): acetic acid    Procedure:     Colposcopy with: cervical biopsy and endocervical curettage      Colposcopy details:  Acetowhite areas noted at 10, 1 o clock. 3 mm insize, regular borders.    Area of acetowhite with mosaicism noted at the 12 o' clock position 7 mm in size.    Cervix visibility: fully visualized      SCJ visibility: fully visualized      Lesion visualized: fully visualized      Acetowhite lesion(s): cervix      Vascular changes: cervix    Post-procedure:     Patient tolerance of procedure:  Patient tolerated the procedure well with no immediate complications    Estimated blood loss (mL):  5    Instructions and paperwork completed: yes           Juancarlos Jones MD 07/22/25 4:15 PM

## 2025-07-31 ENCOUNTER — RESULTS FOLLOW-UP (OUTPATIENT)
Dept: OBSTETRICS AND GYNECOLOGY | Facility: CLINIC | Age: 25
End: 2025-07-31
Payer: COMMERCIAL

## 2025-07-31 NOTE — TELEPHONE ENCOUNTER
----- Message from Juancarlos Jones sent at 7/31/2025  8:42 AM EDT -----  Overall reassuring, repeat pap in 1 year.  ----- Message -----  From: Katherine Austin MA  Sent: 7/22/2025   2:51 PM EDT  To: Juancarlos Jones MD

## 2025-09-10 ENCOUNTER — APPOINTMENT (OUTPATIENT)
Dept: OBSTETRICS AND GYNECOLOGY | Facility: CLINIC | Age: 25
End: 2025-09-10
Payer: COMMERCIAL

## 2026-05-28 ENCOUNTER — APPOINTMENT (OUTPATIENT)
Dept: OBSTETRICS AND GYNECOLOGY | Facility: CLINIC | Age: 26
End: 2026-05-28
Payer: COMMERCIAL